# Patient Record
Sex: FEMALE | Race: ASIAN | NOT HISPANIC OR LATINO
[De-identification: names, ages, dates, MRNs, and addresses within clinical notes are randomized per-mention and may not be internally consistent; named-entity substitution may affect disease eponyms.]

---

## 2022-06-03 PROBLEM — Z00.00 ENCOUNTER FOR PREVENTIVE HEALTH EXAMINATION: Status: ACTIVE | Noted: 2022-06-03

## 2022-06-10 ENCOUNTER — APPOINTMENT (OUTPATIENT)
Dept: GYNECOLOGIC ONCOLOGY | Facility: CLINIC | Age: 67
End: 2022-06-10
Payer: MEDICARE

## 2022-06-10 VITALS
HEIGHT: 68.11 IN | HEART RATE: 105 BPM | SYSTOLIC BLOOD PRESSURE: 136 MMHG | DIASTOLIC BLOOD PRESSURE: 79 MMHG | BODY MASS INDEX: 17.28 KG/M2 | WEIGHT: 114 LBS

## 2022-06-10 DIAGNOSIS — Z80.0 FAMILY HISTORY OF MALIGNANT NEOPLASM OF DIGESTIVE ORGANS: ICD-10-CM

## 2022-06-10 DIAGNOSIS — Z41.9 ENCOUNTER FOR PROCEDURE FOR PURPOSES OTHER THAN REMEDYING HEALTH STATE, UNSPECIFIED: ICD-10-CM

## 2022-06-10 PROCEDURE — 99205 OFFICE O/P NEW HI 60 MIN: CPT

## 2022-06-13 ENCOUNTER — OUTPATIENT (OUTPATIENT)
Dept: OUTPATIENT SERVICES | Facility: HOSPITAL | Age: 67
LOS: 1 days | End: 2022-06-13
Payer: MEDICARE

## 2022-06-13 VITALS
HEIGHT: 65 IN | DIASTOLIC BLOOD PRESSURE: 75 MMHG | RESPIRATION RATE: 16 BRPM | OXYGEN SATURATION: 97 % | SYSTOLIC BLOOD PRESSURE: 124 MMHG | TEMPERATURE: 98 F | WEIGHT: 143.96 LBS | HEART RATE: 78 BPM

## 2022-06-13 DIAGNOSIS — N83.299 OTHER OVARIAN CYST, UNSPECIFIED SIDE: ICD-10-CM

## 2022-06-13 DIAGNOSIS — N83.209 UNSPECIFIED OVARIAN CYST, UNSPECIFIED SIDE: ICD-10-CM

## 2022-06-13 DIAGNOSIS — R54 AGE-RELATED PHYSICAL DEBILITY: ICD-10-CM

## 2022-06-13 DIAGNOSIS — R52 PAIN, UNSPECIFIED: Chronic | ICD-10-CM

## 2022-06-13 LAB
ANION GAP SERPL CALC-SCNC: 10 MMOL/L — SIGNIFICANT CHANGE UP (ref 7–14)
BLD GP AB SCN SERPL QL: NEGATIVE — SIGNIFICANT CHANGE UP
BUN SERPL-MCNC: 17 MG/DL — SIGNIFICANT CHANGE UP (ref 7–23)
CALCIUM SERPL-MCNC: 9.3 MG/DL — SIGNIFICANT CHANGE UP (ref 8.4–10.5)
CHLORIDE SERPL-SCNC: 103 MMOL/L — SIGNIFICANT CHANGE UP (ref 98–107)
CO2 SERPL-SCNC: 29 MMOL/L — SIGNIFICANT CHANGE UP (ref 22–31)
CREAT SERPL-MCNC: 0.66 MG/DL — SIGNIFICANT CHANGE UP (ref 0.5–1.3)
EGFR: 96 ML/MIN/1.73M2 — SIGNIFICANT CHANGE UP
GLUCOSE SERPL-MCNC: 97 MG/DL — SIGNIFICANT CHANGE UP (ref 70–99)
HCT VFR BLD CALC: 38.1 % — SIGNIFICANT CHANGE UP (ref 34.5–45)
HGB BLD-MCNC: 12.5 G/DL — SIGNIFICANT CHANGE UP (ref 11.5–15.5)
MCHC RBC-ENTMCNC: 30.6 PG — SIGNIFICANT CHANGE UP (ref 27–34)
MCHC RBC-ENTMCNC: 32.8 GM/DL — SIGNIFICANT CHANGE UP (ref 32–36)
MCV RBC AUTO: 93.2 FL — SIGNIFICANT CHANGE UP (ref 80–100)
NRBC # BLD: 0 /100 WBCS — SIGNIFICANT CHANGE UP
NRBC # FLD: 0 K/UL — SIGNIFICANT CHANGE UP
PLATELET # BLD AUTO: 209 K/UL — SIGNIFICANT CHANGE UP (ref 150–400)
POTASSIUM SERPL-MCNC: 4.4 MMOL/L — SIGNIFICANT CHANGE UP (ref 3.5–5.3)
POTASSIUM SERPL-SCNC: 4.4 MMOL/L — SIGNIFICANT CHANGE UP (ref 3.5–5.3)
RBC # BLD: 4.09 M/UL — SIGNIFICANT CHANGE UP (ref 3.8–5.2)
RBC # FLD: 13.7 % — SIGNIFICANT CHANGE UP (ref 10.3–14.5)
RH IG SCN BLD-IMP: POSITIVE — SIGNIFICANT CHANGE UP
SODIUM SERPL-SCNC: 142 MMOL/L — SIGNIFICANT CHANGE UP (ref 135–145)
WBC # BLD: 6.19 K/UL — SIGNIFICANT CHANGE UP (ref 3.8–10.5)
WBC # FLD AUTO: 6.19 K/UL — SIGNIFICANT CHANGE UP (ref 3.8–10.5)

## 2022-06-13 PROCEDURE — 93010 ELECTROCARDIOGRAM REPORT: CPT

## 2022-06-13 RX ORDER — MULTIVIT-MIN/FERROUS GLUCONATE 9 MG/15 ML
1 LIQUID (ML) ORAL
Qty: 0 | Refills: 0 | DISCHARGE

## 2022-06-13 NOTE — H&P PST ADULT - PROBLEM SELECTOR PLAN 2
Await prearranged medical evaluation with pcp due to major surgery and advanced age  * Instructed to take normal am dose of omeprazole the am of surgery

## 2022-06-13 NOTE — H&P PST ADULT - NSICDXPASTSURGICALHX_GEN_ALL_CORE_FT
PAST SURGICAL HISTORY:   delivery delivered 1989    Pain left arm surgery     PAST SURGICAL HISTORY:   delivery delivered 1989    Pain left arm surgery with hardware

## 2022-06-13 NOTE — H&P PST ADULT - NSANTHOSAYNRD_GEN_A_CORE
No. JCARLOS screening performed.  STOP BANG Legend: 0-2 = LOW Risk; 3-4 = INTERMEDIATE Risk; 5-8 = HIGH Risk

## 2022-06-13 NOTE — H&P PST ADULT - NSICDXFAMILYHX_GEN_ALL_CORE_FT
FAMILY HISTORY:  Father  Still living? No  FHx: throat cancer, Age at diagnosis: Age Unknown    Sibling  Still living? No  FH: liver cancer, Age at diagnosis: Age Unknown

## 2022-06-13 NOTE — H&P PST ADULT - NS PRO LAST MENSTRUAL DATE
Oxcarbazepine 300 mg refill refused. Not due until Jan 2019    Last refill:  oxcarbazepine (TRILEPTAL) 300 MG tablet 60 tablet 3 9/12/2018     Sig - Route: Take 1 tablet by mouth 2 times daily. - Oral          
postmenopausal

## 2022-06-13 NOTE — H&P PST ADULT - REASON FOR ADMISSION
they are taking out the uterus, and tubes and ovaries as per the son, [place], they are taking out the uterus, and tubes and ovaries

## 2022-06-13 NOTE — H&P PST ADULT - PROBLEM SELECTOR PLAN 1
This is a 67 y/o female who is scheduled for total lap hysterectomy, b/l salpingo oophorectomy, possible staging on 6-21-22 This is a 67 y/o female who is scheduled for total lap hysterectomy, b/l salpingo oophorectomy, possible staging on 6-21-22  * Ordered covid-19 PCR and given phone numbers of the facilities that do testing  * Given preop and cleanser instructions with good teach back and patient verbalized understanding

## 2022-06-13 NOTE — H&P PST ADULT - NSICDXPASTMEDICALHX_GEN_ALL_CORE_FT
PAST MEDICAL HISTORY:  Hypercholesterolemia     Language barrier native language is Mandarin, Chinese; comprehends and verbalizes minimal English    Osteoporosis      PAST MEDICAL HISTORY:  Anxiety and depression     GERD (gastroesophageal reflux disease)     Hypercholesterolemia     Language barrier native language is Mandarin, Chinese; comprehends and verbalizes minimal English    Osteoporosis     Ovarian cyst left side in 2022

## 2022-06-13 NOTE — H&P PST ADULT - HISTORY OF PRESENT ILLNESS
This is a 65 y/o female whose native language is Mandarin Chinese. Refused hospital . Utilized son, Iesha, to function as . Patient presents with finding of ovarian cyst. Followed with serial sonograms with recent sonogram revealing increased growth. Further intervention warranted. Scheduled  for total laparoscopic hysterectomy, b/l salpingo oophorectomy, possible staging This is a 67 y/o female whose native language is Mandarin Chinese. Refused hospital . Utilized son, Iesha, to function as . Patient presents with finding of ovarian cyst. Followed with serial sonograms with recent sonogram revealing increased growth. Work up included MRI. Further intervention warranted. Scheduled  for total laparoscopic hysterectomy, b/l salpingo oophorectomy, possible staging

## 2022-06-14 LAB — HPV HIGH+LOW RISK DNA PNL CVX: NOT DETECTED

## 2022-06-20 LAB — CYTOLOGY CVX/VAG DOC THIN PREP: NORMAL

## 2022-06-21 LAB — SARS-COV-2 N GENE NPH QL NAA+PROBE: NOT DETECTED

## 2022-06-22 ENCOUNTER — TRANSCRIPTION ENCOUNTER (OUTPATIENT)
Age: 67
End: 2022-06-22

## 2022-06-22 NOTE — ASU PATIENT PROFILE, ADULT - FALL HARM RISK - UNIVERSAL INTERVENTIONS
Bed in lowest position, wheels locked, appropriate side rails in place/Call bell, personal items and telephone in reach/Instruct patient to call for assistance before getting out of bed or chair/Non-slip footwear when patient is out of bed/[place] to call system/Physically safe environment - no spills, clutter or unnecessary equipment/Purposeful Proactive Rounding/Room/bathroom lighting operational, light cord in reach

## 2022-06-22 NOTE — ASU PATIENT PROFILE, ADULT - NSICDXPASTMEDICALHX_GEN_ALL_CORE_FT
PAST MEDICAL HISTORY:  Anxiety and depression     GERD (gastroesophageal reflux disease)     Hypercholesterolemia     Language barrier native language is Mandarin, Chinese; comprehends and verbalizes minimal English    Osteoporosis     Ovarian cyst left side in 2022

## 2022-06-23 ENCOUNTER — INPATIENT (INPATIENT)
Facility: HOSPITAL | Age: 67
LOS: 0 days | Discharge: HOME | End: 2022-06-24
Attending: OBSTETRICS & GYNECOLOGY | Admitting: OBSTETRICS & GYNECOLOGY
Payer: MEDICARE

## 2022-06-23 ENCOUNTER — RESULT REVIEW (OUTPATIENT)
Age: 67
End: 2022-06-23

## 2022-06-23 ENCOUNTER — APPOINTMENT (OUTPATIENT)
Dept: GYNECOLOGIC ONCOLOGY | Facility: HOSPITAL | Age: 67
End: 2022-06-23

## 2022-06-23 ENCOUNTER — TRANSCRIPTION ENCOUNTER (OUTPATIENT)
Age: 67
End: 2022-06-23

## 2022-06-23 VITALS
WEIGHT: 143.96 LBS | SYSTOLIC BLOOD PRESSURE: 127 MMHG | HEART RATE: 78 BPM | RESPIRATION RATE: 14 BRPM | TEMPERATURE: 98 F | HEIGHT: 65 IN | DIASTOLIC BLOOD PRESSURE: 72 MMHG | OXYGEN SATURATION: 98 %

## 2022-06-23 DIAGNOSIS — N83.299 OTHER OVARIAN CYST, UNSPECIFIED SIDE: ICD-10-CM

## 2022-06-23 DIAGNOSIS — R52 PAIN, UNSPECIFIED: Chronic | ICD-10-CM

## 2022-06-23 PROCEDURE — 38573 LAPS PELVIC LYMPHADEC: CPT

## 2022-06-23 PROCEDURE — 58571 TLH W/T/O 250 G OR LESS: CPT

## 2022-06-23 PROCEDURE — 88341 IMHCHEM/IMCYTCHM EA ADD ANTB: CPT | Mod: 26,59

## 2022-06-23 PROCEDURE — 88331 PATH CONSLTJ SURG 1 BLK 1SPC: CPT | Mod: 26

## 2022-06-23 PROCEDURE — 88305 TISSUE EXAM BY PATHOLOGIST: CPT | Mod: 26

## 2022-06-23 PROCEDURE — 88307 TISSUE EXAM BY PATHOLOGIST: CPT | Mod: 26

## 2022-06-23 PROCEDURE — S2900 ROBOTIC SURGICAL SYSTEM: CPT | Mod: NC

## 2022-06-23 PROCEDURE — 88342 IMHCHEM/IMCYTCHM 1ST ANTB: CPT | Mod: 26,59

## 2022-06-23 PROCEDURE — 88360 TUMOR IMMUNOHISTOCHEM/MANUAL: CPT | Mod: 26

## 2022-06-23 PROCEDURE — 88309 TISSUE EXAM BY PATHOLOGIST: CPT | Mod: 26

## 2022-06-23 PROCEDURE — 88112 CYTOPATH CELL ENHANCE TECH: CPT | Mod: 26

## 2022-06-23 DEVICE — SURGICEL POWDER 3 GRAMS
Type: IMPLANTABLE DEVICE | Status: NON-FUNCTIONAL
Removed: 2022-06-23

## 2022-06-23 RX ORDER — HEPARIN SODIUM 5000 [USP'U]/ML
5000 INJECTION INTRAVENOUS; SUBCUTANEOUS EVERY 8 HOURS
Refills: 0 | Status: DISCONTINUED | OUTPATIENT
Start: 2022-06-23 | End: 2022-06-24

## 2022-06-23 RX ORDER — SODIUM CHLORIDE 9 MG/ML
1000 INJECTION, SOLUTION INTRAVENOUS
Refills: 0 | Status: DISCONTINUED | OUTPATIENT
Start: 2022-06-23 | End: 2022-06-24

## 2022-06-23 RX ORDER — MULTIVIT-MIN/FERROUS GLUCONATE 9 MG/15 ML
1 LIQUID (ML) ORAL
Qty: 0 | Refills: 0 | DISCHARGE

## 2022-06-23 RX ORDER — PANTOPRAZOLE SODIUM 20 MG/1
40 TABLET, DELAYED RELEASE ORAL
Refills: 0 | Status: DISCONTINUED | OUTPATIENT
Start: 2022-06-23 | End: 2022-06-24

## 2022-06-23 RX ORDER — FOLIC ACID 0.8 MG
1 TABLET ORAL
Qty: 0 | Refills: 0 | DISCHARGE

## 2022-06-23 RX ORDER — ERGOCALCIFEROL 1.25 MG/1
1 CAPSULE ORAL
Qty: 0 | Refills: 0 | DISCHARGE

## 2022-06-23 RX ORDER — OXYCODONE HYDROCHLORIDE 5 MG/1
1 TABLET ORAL
Qty: 6 | Refills: 0
Start: 2022-06-23 | End: 2022-06-24

## 2022-06-23 RX ORDER — RIBOFLAVIN (VITAMIN B2) 25 MG
1 TABLET ORAL
Qty: 0 | Refills: 0 | DISCHARGE

## 2022-06-23 RX ORDER — OXYCODONE HYDROCHLORIDE 5 MG/1
5 TABLET ORAL ONCE
Refills: 0 | Status: DISCONTINUED | OUTPATIENT
Start: 2022-06-23 | End: 2022-06-24

## 2022-06-23 RX ORDER — OXYCODONE HYDROCHLORIDE 5 MG/1
5 TABLET ORAL EVERY 4 HOURS
Refills: 0 | Status: DISCONTINUED | OUTPATIENT
Start: 2022-06-23 | End: 2022-06-24

## 2022-06-23 RX ORDER — ONDANSETRON 8 MG/1
4 TABLET, FILM COATED ORAL ONCE
Refills: 0 | Status: DISCONTINUED | OUTPATIENT
Start: 2022-06-23 | End: 2022-06-24

## 2022-06-23 RX ORDER — FENTANYL CITRATE 50 UG/ML
25 INJECTION INTRAVENOUS
Refills: 0 | Status: DISCONTINUED | OUTPATIENT
Start: 2022-06-23 | End: 2022-06-24

## 2022-06-23 RX ORDER — MECLIZINE HCL 12.5 MG
1 TABLET ORAL
Qty: 0 | Refills: 0 | DISCHARGE

## 2022-06-23 RX ORDER — OMEPRAZOLE 10 MG/1
1 CAPSULE, DELAYED RELEASE ORAL
Qty: 0 | Refills: 0 | DISCHARGE

## 2022-06-23 RX ORDER — ATORVASTATIN CALCIUM 80 MG/1
1 TABLET, FILM COATED ORAL
Qty: 0 | Refills: 0 | DISCHARGE

## 2022-06-23 RX ORDER — IBUPROFEN 200 MG
600 TABLET ORAL EVERY 6 HOURS
Refills: 0 | Status: DISCONTINUED | OUTPATIENT
Start: 2022-06-23 | End: 2022-06-24

## 2022-06-23 RX ORDER — ACETAMINOPHEN 500 MG
975 TABLET ORAL EVERY 6 HOURS
Refills: 0 | Status: DISCONTINUED | OUTPATIENT
Start: 2022-06-23 | End: 2022-06-24

## 2022-06-23 RX ORDER — ICOSAPENT ETHYL 500 MG/1
2 CAPSULE, LIQUID FILLED ORAL
Qty: 0 | Refills: 0 | DISCHARGE

## 2022-06-23 RX ORDER — ALENDRONATE SODIUM 70 MG/1
1 TABLET ORAL
Qty: 0 | Refills: 0 | DISCHARGE

## 2022-06-23 RX ADMIN — HEPARIN SODIUM 5000 UNIT(S): 5000 INJECTION INTRAVENOUS; SUBCUTANEOUS at 21:00

## 2022-06-23 RX ADMIN — FENTANYL CITRATE 25 MICROGRAM(S): 50 INJECTION INTRAVENOUS at 19:28

## 2022-06-23 RX ADMIN — SODIUM CHLORIDE 125 MILLILITER(S): 9 INJECTION, SOLUTION INTRAVENOUS at 19:29

## 2022-06-23 RX ADMIN — FENTANYL CITRATE 25 MICROGRAM(S): 50 INJECTION INTRAVENOUS at 19:43

## 2022-06-23 NOTE — ASU DISCHARGE PLAN (ADULT/PEDIATRIC) - ASU DC SPECIAL INSTRUCTIONSFT
Regular diet as tolerated, regular activity as tolerated, no heavy lifting for first two weeks.  Nothing per vagina: no intercourse, tampons or douching.  Call your provider if you experience fevers, chills, worsening abdominal pain, inability to urinate or worsening vaginal bleeding.  Follow up with Dr. Jarvis in 2 weeks

## 2022-06-23 NOTE — ASU DISCHARGE PLAN (ADULT/PEDIATRIC) - NS MD DC FALL RISK RISK
For information on Fall & Injury Prevention, visit: [email or web address] OR  [email or web address] OR  https://www.cdc.gov/steadi/patient.html

## 2022-06-23 NOTE — CHART NOTE - NSCHARTNOTEFT_GEN_A_CORE
**Incomplete**    R1 OBGYN POST-OP CHECK    S: Patient seen and evaluated at bedside.  Pt awake and alert resting comfortably in bed. Patient reports pain controlled with analgesia. Pt denies N/V, SOB, CP, palpitations, fever/chills. Tolerating clears.  Not OOB yet.    O:   T(C): 36.6 (06-23-22 @ 21:00), Max: 36.8 (06-23-22 @ 20:15)  HR: 79 (06-23-22 @ 21:15) (77 - 89)  BP: 124/63 (06-23-22 @ 21:15) (121/64 - 140/121)  RR: 12 (06-23-22 @ 21:15) (11 - 15)  SpO2: 100% (06-23-22 @ 21:15) (96% - 100%)  Wt(kg): --  I&O's Summary    23 Jun 2022 07:01  -  23 Jun 2022 21:53  --------------------------------------------------------  IN: 250 mL / OUT: 360 mL / NET: -110 mL        Gen: Resting comfortably in bed, NAD  CV: S1S2, RRR  Lungs: CTA B/L  Abd: soft, appropriately tender, occasional BS x 4 quadrants.    Inc: Clean/dry/intact w/ bandage in place  Ext: SCD's in place and functional, non-tender b/l, no edema        A/P: 66y Female s/p ......    Neuro: PO Analgesia PRN  vs. PCA for pain control  CV: Hemodynamically stable.  Monitor VS. CBC in AM.   Pulm: Saturating well on room air.  Encourage OOB and incentive spirometer use.   GI: Advance to regular diet. Anti-emetics PRN.  : Meraz to gravity. TOV in AM/DTV by   FEN: Electrolytes: LR@125cc/hr. BMP in AM.   Heme: DVT ppx w/ SCD's while in bed. Early ambulation, initially with assistance then as tolerated.  Continue HSQ   ID: Afebrile  Endo: No active issues   Dispo: Discharge from PACU when criteria met.     BERTRAND Quezada, PGY2 R1 OBGYN POST-OP CHECK  Miryam  [place] ID#[id number]    S: Patient seen and evaluated at bedside.  Pt sleeping in bed, able to be awakened. Patient reports pain controlled with analgesia. Pt denies N/V, SOB, CP, palpitations, fever/chills. Tolerating clears.  Not OOB yet.    O:   T(C): 36.6 (06-23-22 @ 21:00), Max: 36.8 (06-23-22 @ 20:15)  HR: 79 (06-23-22 @ 21:15) (77 - 89)  BP: 124/63 (06-23-22 @ 21:15) (121/64 - 140/121)  RR: 12 (06-23-22 @ 21:15) (11 - 15)  SpO2: 100% (06-23-22 @ 21:15) (96% - 100%)  Wt(kg): --  I&O's Summary    23 Jun 2022 07:01  -  23 Jun 2022 21:53  --------------------------------------------------------  IN: 250 mL / OUT: 360 mL / NET: -110 mL        Gen: Resting comfortably in bed, NAD  CV: S1S2, RRR  Lungs: CTA B/L  Abd: soft, appropriately tender, occasional BS x 4 quadrants.    Inc: 4 LSC incisions clean/dry/intact w/ bandage in place  Ext: SCD's in place and functional, non-tender b/l, no edema        A/P: 66y Female s/p RA-TLH, BSO    Neuro: PO Analgesia PRN    CV: Hemodynamically stable.  Monitor VS. CBC in AM.   Pulm: Saturating well on room air.  Encourage OOB and incentive spirometer use.   GI: Advance to regular diet. Anti-emetics PRN.  : Meraz to gravity until AM  FEN: Electrolytes: LR@125cc/hr. BMP in AM.   Heme: DVT ppx w/ SCD's while in bed. Early ambulation, initially with assistance then as tolerated.  Continue HSQ   ID: Afebrile  Endo: No active issues   Dispo: Floor once PACU discharge criteria met    M. Pilar, PGY1

## 2022-06-23 NOTE — BRIEF OPERATIVE NOTE - OPERATION/FINDINGS
Approx. 8 cm left sided adnexal mass w/ solid and cystic components filled w/ mucinous fluid.  Intraoperative pathology= frozen.  Right adnexa and uterus wnl.  No evidence of obvious metastatic disease.  Enlarged obturator node on the right side.

## 2022-06-23 NOTE — BRIEF OPERATIVE NOTE - NSICDXBRIEFPROCEDURE_GEN_ALL_CORE_FT
PROCEDURES:  Exam under anesthesia, pelvic 23-Jun-2022 19:01:10  Liliana Galvan  Hysterectomy, total, robot-assisted, laparoscopic, with bilateral salpingo-oophorectomy and lymph node sampling 23-Jun-2022 19:01:38  Liliana Galvan  Partial omentectomy 23-Jun-2022 19:01:50  Liliana Galvan  Paraaortic node dissection 23-Jun-2022 19:01:59  Liliana Galvan

## 2022-06-23 NOTE — ASU PREOP CHECKLIST - SELECT TESTS ORDERED
BMP/CBC/Type and Screen/EKG/COVID-19 BMP/CBC/Type and Screen/EKG/POCT Blood Glucose/COVID-19 93/BMP/CBC/Type and Screen/EKG/POCT Blood Glucose/COVID-19

## 2022-06-23 NOTE — ASU DISCHARGE PLAN (ADULT/PEDIATRIC) - PROCEDURE
Robotic assisted total laparoscopic hysterectomy with bilateral salpingo-oophorectomy pelvic lymph node dissection

## 2022-06-23 NOTE — BRIEF OPERATIVE NOTE - SPECIMENS
pelvic washings, cervix, uterus, bilateral fallopian tubes and ovaries, pelvic lymph nodes, paraaortic lymph nodes, omentum

## 2022-06-23 NOTE — ASU PREOP CHECKLIST - NOTHING BY MOUTH SINCE
CLINICAL PHARMACY NOTE: MEDS TO [address] Select Patient?: No  Total # of Prescriptions Filled: 0   The following medications were delivered to the patient:  · Cipro  · Prednisone  · Furosemide  Total # of Interventions Completed: 0  Time Spent (min): 15    Additional Documentation:    Patient discharged 5/12/20, prescriptions not picked up. 22-Jun-2022 20:00

## 2022-06-23 NOTE — ASU DISCHARGE PLAN (ADULT/PEDIATRIC) - CARE PROVIDER_API CALL
Linda Jarvis)  Gynecologic Oncology; Obstetrics and Gynecology  [address] [place] [address]  Phone: [phone]  Fax: [phone]  Follow Up Time:

## 2022-06-24 ENCOUNTER — TRANSCRIPTION ENCOUNTER (OUTPATIENT)
Age: 67
End: 2022-06-24

## 2022-06-24 VITALS
SYSTOLIC BLOOD PRESSURE: 98 MMHG | DIASTOLIC BLOOD PRESSURE: 52 MMHG | HEART RATE: 76 BPM | TEMPERATURE: 98 F | OXYGEN SATURATION: 97 % | RESPIRATION RATE: 17 BRPM

## 2022-06-24 DIAGNOSIS — N83.8 OTHER NONINFLAMMATORY DISORDERS OF OVARY, FALLOPIAN TUBE AND BROAD LIGAMENT: ICD-10-CM

## 2022-06-24 LAB
ANION GAP SERPL CALC-SCNC: 10 MMOL/L — SIGNIFICANT CHANGE UP (ref 7–14)
BASOPHILS # BLD AUTO: 0.01 K/UL — SIGNIFICANT CHANGE UP (ref 0–0.2)
BASOPHILS NFR BLD AUTO: 0.1 % — SIGNIFICANT CHANGE UP (ref 0–2)
BUN SERPL-MCNC: 12 MG/DL — SIGNIFICANT CHANGE UP (ref 7–23)
CALCIUM SERPL-MCNC: 8.2 MG/DL — LOW (ref 8.4–10.5)
CHLORIDE SERPL-SCNC: 105 MMOL/L — SIGNIFICANT CHANGE UP (ref 98–107)
CO2 SERPL-SCNC: 25 MMOL/L — SIGNIFICANT CHANGE UP (ref 22–31)
CREAT SERPL-MCNC: 0.62 MG/DL — SIGNIFICANT CHANGE UP (ref 0.5–1.3)
EGFR: 98 ML/MIN/1.73M2 — SIGNIFICANT CHANGE UP
EOSINOPHIL # BLD AUTO: 0 K/UL — SIGNIFICANT CHANGE UP (ref 0–0.5)
EOSINOPHIL NFR BLD AUTO: 0 % — SIGNIFICANT CHANGE UP (ref 0–6)
GLUCOSE BLDC GLUCOMTR-MCNC: 93 MG/DL — SIGNIFICANT CHANGE UP (ref 70–99)
GLUCOSE SERPL-MCNC: 105 MG/DL — HIGH (ref 70–99)
HCT VFR BLD CALC: 34.7 % — SIGNIFICANT CHANGE UP (ref 34.5–45)
HGB BLD-MCNC: 11.5 G/DL — SIGNIFICANT CHANGE UP (ref 11.5–15.5)
IANC: 6 K/UL — SIGNIFICANT CHANGE UP (ref 1.8–7.4)
IMM GRANULOCYTES NFR BLD AUTO: 0.6 % — SIGNIFICANT CHANGE UP (ref 0–1.5)
LYMPHOCYTES # BLD AUTO: 1.37 K/UL — SIGNIFICANT CHANGE UP (ref 1–3.3)
LYMPHOCYTES # BLD AUTO: 17.2 % — SIGNIFICANT CHANGE UP (ref 13–44)
MAGNESIUM SERPL-MCNC: 1.8 MG/DL — SIGNIFICANT CHANGE UP (ref 1.6–2.6)
MCHC RBC-ENTMCNC: 30.7 PG — SIGNIFICANT CHANGE UP (ref 27–34)
MCHC RBC-ENTMCNC: 33.1 GM/DL — SIGNIFICANT CHANGE UP (ref 32–36)
MCV RBC AUTO: 92.5 FL — SIGNIFICANT CHANGE UP (ref 80–100)
MONOCYTES # BLD AUTO: 0.55 K/UL — SIGNIFICANT CHANGE UP (ref 0–0.9)
MONOCYTES NFR BLD AUTO: 6.9 % — SIGNIFICANT CHANGE UP (ref 2–14)
NEUTROPHILS # BLD AUTO: 6 K/UL — SIGNIFICANT CHANGE UP (ref 1.8–7.4)
NEUTROPHILS NFR BLD AUTO: 75.2 % — SIGNIFICANT CHANGE UP (ref 43–77)
NON-GYNECOLOGICAL CYTOLOGY STUDY: SIGNIFICANT CHANGE UP
NRBC # BLD: 0 /100 WBCS — SIGNIFICANT CHANGE UP
NRBC # FLD: 0 K/UL — SIGNIFICANT CHANGE UP
PHOSPHATE SERPL-MCNC: 3.2 MG/DL — SIGNIFICANT CHANGE UP (ref 2.5–4.5)
PLATELET # BLD AUTO: 169 K/UL — SIGNIFICANT CHANGE UP (ref 150–400)
POTASSIUM SERPL-MCNC: 3.4 MMOL/L — LOW (ref 3.5–5.3)
POTASSIUM SERPL-SCNC: 3.4 MMOL/L — LOW (ref 3.5–5.3)
RBC # BLD: 3.75 M/UL — LOW (ref 3.8–5.2)
RBC # FLD: 13.9 % — SIGNIFICANT CHANGE UP (ref 10.3–14.5)
SODIUM SERPL-SCNC: 140 MMOL/L — SIGNIFICANT CHANGE UP (ref 135–145)
WBC # BLD: 7.98 K/UL — SIGNIFICANT CHANGE UP (ref 3.8–10.5)
WBC # FLD AUTO: 7.98 K/UL — SIGNIFICANT CHANGE UP (ref 3.8–10.5)

## 2022-06-24 RX ORDER — POTASSIUM CHLORIDE 20 MEQ
40 PACKET (EA) ORAL EVERY 4 HOURS
Refills: 0 | Status: DISCONTINUED | OUTPATIENT
Start: 2022-06-24 | End: 2022-06-24

## 2022-06-24 RX ORDER — MAGNESIUM OXIDE 400 MG ORAL TABLET 241.3 MG
400 TABLET ORAL ONCE
Refills: 0 | Status: COMPLETED | OUTPATIENT
Start: 2022-06-24 | End: 2022-06-24

## 2022-06-24 RX ADMIN — Medication 975 MILLIGRAM(S): at 00:15

## 2022-06-24 RX ADMIN — PANTOPRAZOLE SODIUM 40 MILLIGRAM(S): 20 TABLET, DELAYED RELEASE ORAL at 07:23

## 2022-06-24 RX ADMIN — Medication 975 MILLIGRAM(S): at 05:57

## 2022-06-24 RX ADMIN — SODIUM CHLORIDE 125 MILLILITER(S): 9 INJECTION, SOLUTION INTRAVENOUS at 00:29

## 2022-06-24 RX ADMIN — MAGNESIUM OXIDE 400 MG ORAL TABLET 400 MILLIGRAM(S): 241.3 TABLET ORAL at 12:13

## 2022-06-24 RX ADMIN — HEPARIN SODIUM 5000 UNIT(S): 5000 INJECTION INTRAVENOUS; SUBCUTANEOUS at 05:57

## 2022-06-24 RX ADMIN — Medication 40 MILLIEQUIVALENT(S): at 11:29

## 2022-06-24 RX ADMIN — Medication 600 MILLIGRAM(S): at 05:57

## 2022-06-24 RX ADMIN — Medication 600 MILLIGRAM(S): at 00:15

## 2022-06-24 RX ADMIN — Medication 600 MILLIGRAM(S): at 11:29

## 2022-06-24 RX ADMIN — Medication 975 MILLIGRAM(S): at 11:29

## 2022-06-24 NOTE — DISCHARGE NOTE PROVIDER - NSDCMRMEDTOKEN_GEN_ALL_CORE_FT
alendronate 70 mg oral tablet: 1 tab(s) orally once a week  atorvastatin 10 mg oral tablet: 1 tab(s) orally once a day  Calcium 600+D oral tablet: 1 tab(s) orally 2 times a day  ergocalciferol 1.25 mg (50,000 intl units) oral capsule: 1 cap(s) orally once a week  folic acid 1 mg oral tablet: 1 tab(s) orally once a day  meclizine 12.5 mg oral tablet: 1 tab(s) orally 2 times a day, As Needed  naproxen 500 mg oral tablet: 1 tab(s) orally 2 times a day MDD:2 tabs  omeprazole 20 mg oral delayed release tablet: 1 tab(s) orally once a day  oxyCODONE 5 mg oral tablet: 1 tab(s) orally every 8 hours, As Needed -for severe pain MDD:3 tabs   Prosight oral tablet: 1 tab(s) orally once a day  6/13/2022  refresh lubricant eye ointment: 1 application to each affected eye once a day (at bedtime)  Refresh Optive ophthalmic solution: 1 drop(s) to each affected eye 4 times a day, As Needed  Vascepa 1 g oral capsule: 2 cap(s) orally 2 times a day  6/13/2022  Vitamin B2 100 mg oral tablet: 1 tab(s) orally once a day  6/13/2022

## 2022-06-24 NOTE — PROGRESS NOTE ADULT - ATTENDING COMMENTS
Patient seen and examined at bedside, agree with above gyn resident note, including assessment and plan. Abdomen benign. Discussed today's plan of care with patient, all questions answered. Continue routine postop care, d/c home today.

## 2022-06-24 NOTE — PROGRESS NOTE ADULT - SUBJECTIVE AND OBJECTIVE BOX
Gyn ONC Progress Note POD #1    Subjective:   Patient seen and examined at bedside.  No acute events overnight. No acute complaints.  Pain well controlled.  Patient is ambulating and tolerating regular diet.  Has not yet passed flatus vs. Patient is passing flatus.    Patient is voiding spontaneously vs. Meraz is still in place.   Denies lightheadedness, dizziness, CP, SOB, N/V, fevers, and chills.    Objective:  T(F): 98.9 (06-24-22 @ 02:30), Max: 99 (06-24-22 @ 00:00)  HR: 75 (06-24-22 @ 02:30) (75 - 89)  BP: 113/54 (06-24-22 @ 02:30) (112/57 - 140/121)  RR: 15 (06-24-22 @ 02:30) (11 - 19)  SpO2: 95% (06-24-22 @ 02:30) (95% - 100%)  Wt(kg): --  I&O's Summary    23 Jun 2022 07:01  -  24 Jun 2022 04:59  --------------------------------------------------------  IN: 950 mL / OUT: 960 mL / NET: -10 mL      CAPILLARY BLOOD GLUCOSE          MEDICATIONS  (STANDING):  acetaminophen     Tablet .. 975 milliGRAM(s) Oral every 6 hours  heparin   Injectable 5000 Unit(s) SubCutaneous every 8 hours  ibuprofen  Tablet. 600 milliGRAM(s) Oral every 6 hours  lactated ringers. 1000 milliLiter(s) (30 mL/Hr) IV Continuous <Continuous>  lactated ringers. 1000 milliLiter(s) (125 mL/Hr) IV Continuous <Continuous>  lactated ringers. 1000 milliLiter(s) (125 mL/Hr) IV Continuous <Continuous>  pantoprazole    Tablet 40 milliGRAM(s) Oral before breakfast    MEDICATIONS  (PRN):  oxyCODONE    IR 5 milliGRAM(s) Oral every 4 hours PRN Severe Pain (7 - 10)      Physical Exam:  Constitutional: NAD, A+O x3  CV: RRR  Lungs: clear to auscultation bilaterally  Abdomen: soft, nondistended, no guarding, no rebound, normal bowel sounds  Incision: 4 port site incisions with steri strips in place, clean, dry, intact  Extremities: no lower extremity edema or calf tenderness bilaterally; venodynes in place    LABS:                           Gyn ONC Progress Note POD #1    Subjective:   Patient seen and examined at bedside.  No acute events overnight. No acute complaints.  Pain well controlled. Patient not yet OOB. Tolerating regular diet. Patient is passing flatus.  Meraz is still in place. Denies lightheadedness, dizziness, CP, SOB, N/V, fevers, and chills.    Objective:  T(F): 98.9 (06-24-22 @ 02:30), Max: 99 (06-24-22 @ 00:00)  HR: 75 (06-24-22 @ 02:30) (75 - 89)  BP: 113/54 (06-24-22 @ 02:30) (112/57 - 140/121)  RR: 15 (06-24-22 @ 02:30) (11 - 19)  SpO2: 95% (06-24-22 @ 02:30) (95% - 100%)  Wt(kg): --  I&O's Summary    23 Jun 2022 07:01  -  24 Jun 2022 04:59  --------------------------------------------------------  IN: 950 mL / OUT: 960 mL / NET: -10 mL      CAPILLARY BLOOD GLUCOSE          MEDICATIONS  (STANDING):  acetaminophen     Tablet .. 975 milliGRAM(s) Oral every 6 hours  heparin   Injectable 5000 Unit(s) SubCutaneous every 8 hours  ibuprofen  Tablet. 600 milliGRAM(s) Oral every 6 hours  lactated ringers. 1000 milliLiter(s) (30 mL/Hr) IV Continuous <Continuous>  lactated ringers. 1000 milliLiter(s) (125 mL/Hr) IV Continuous <Continuous>  lactated ringers. 1000 milliLiter(s) (125 mL/Hr) IV Continuous <Continuous>  pantoprazole    Tablet 40 milliGRAM(s) Oral before breakfast    MEDICATIONS  (PRN):  oxyCODONE    IR 5 milliGRAM(s) Oral every 4 hours PRN Severe Pain (7 - 10)      Physical Exam:  Constitutional: NAD, A+O x3  CV: RRR  Lungs: clear to auscultation bilaterally  Abdomen: soft, nondistended, no guarding, no rebound, normal bowel sounds  Incision: 4 port site incisions with steri strips in place, clean, dry, intact  Extremities: no lower extremity edema or calf tenderness bilaterally; venodynes in place    LABS:

## 2022-06-24 NOTE — DISCHARGE NOTE PROVIDER - NSDCCPTREATMENT_GEN_ALL_CORE_FT
PRINCIPAL PROCEDURE  Procedure: Hysterectomy, total, robot-assisted, laparoscopic, with bilateral salpingo-oophorectomy and lymph node sampling  Findings and Treatment:       SECONDARY PROCEDURE  Procedure: Partial omentectomy  Findings and Treatment:     Procedure: Paraaortic node dissection  Findings and Treatment:     Procedure: Exam under anesthesia, pelvic  Findings and Treatment:

## 2022-06-24 NOTE — DISCHARGE NOTE NURSING/CASE MANAGEMENT/SOCIAL WORK - NSDCPEFALRISK_GEN_ALL_CORE
For information on Fall & Injury Prevention, visit: [email or web address] OR  [email or web address] OR  https://www.cdc.gov/steadi/patient.html

## 2022-06-24 NOTE — DISCHARGE NOTE NURSING/CASE MANAGEMENT/SOCIAL WORK - PATIENT PORTAL LINK FT
You can access the FollowMyHealth Patient Portal offered by [facility] by registering at the following website: [email or web address]. By joining [facility]’s FollowMyHealth portal, you will also be able to view your health information using other applications (apps) compatible with our system.

## 2022-06-24 NOTE — DISCHARGE NOTE NURSING/CASE MANAGEMENT/SOCIAL WORK - NSDCPNINST_GEN_ALL_CORE
Notify Dr Jarvis if you experience any increase in pain not relieved with medication, any redness, drainage or swelling around incision any persistent nausea vomiting or any fever >100.5.  Drink plenty of fluid, no heavy lifting or straining.  Use over the counter stool softeners to assist with constipation which can be a side effect of narcotic pain medication.

## 2022-06-24 NOTE — DISCHARGE NOTE PROVIDER - CARE PROVIDER_API CALL
Linda Jarvis)  Gynecologic Oncology; Obstetrics and Gynecology  [address] [place] [address]  Phone: [phone]  Fax: [phone]  Follow Up Time:

## 2022-06-24 NOTE — PROGRESS NOTE ADULT - PROBLEM SELECTOR PLAN 1
GYN ONC Fellow Addendum:    Pt seen and examined at bedside. Agree with above. Pain controlled. Tolerating reg diet, -n/v. -flatus. yet to ambulate. hagan in situ    VS reviewed  Labs reviewed    - Po analgesia  - Reg diet  - d/c hagan  - Encourage ambulation and IS use  - Replete lytes prn  - DVT ppx: lovenox  - OOB  - Dispo: d/c home today    CHRISTOPHER Hope, PGY6

## 2022-06-24 NOTE — DISCHARGE NOTE PROVIDER - HOSPITAL COURSE
Patient underwent an uncomplicated robotic assisted total laparoscopic hysterectomy with bilateral salpingo-oophorectomy, omentectomy, pelvic and para-aortic lymph node dissection for left adnexal mass. EBL: 50cc. Hct:  38.1-> .  POD#0 Pain was well controlled with PO analgesia. Meraz was discontinued and patient voided spontaneously. POD#1 No acute events overnight. Patient tolerated regular diet.  Pain was well controlled. Upon discharge on POD#1, the patient is ambulating, voiding spontaneously, tolerating oral intake, pain was well controlled with oral medication, and vital signs were stable. Patient to have close follow up with Dr Jarvis. Patient Patient underwent an uncomplicated robotic assisted total laparoscopic hysterectomy with bilateral salpingo-oophorectomy, omentectomy, pelvic and para-aortic lymph node dissection for left adnexal mass. EBL: 50cc. Hct:  38.1-> .  POD#0 Pain was well controlled with PO analgesia. Meraz was discontinued and patient voided spontaneously. POD#1 No acute events overnight. Patient tolerated regular diet.  Pain was well controlled. Upon discharge on POD#1, the patient is ambulating, voiding spontaneously, tolerating oral intake, pain was well controlled with oral medication, and vital signs were stable. Patient to have close follow up with Dr Jarvis.              11.5   7.98  )-----------( 169      ( 06-24 @ 06:00 )             34.7

## 2022-06-24 NOTE — PATIENT PROFILE ADULT - FALL HARM RISK - RISK INTERVENTIONS
Assistance OOB with selected safe patient handling equipment/Assistance with ambulation/Communicate Fall Risk and Risk Factors to all staff, patient, and family/Monitor gait and stability/Reinforce activity limits and safety measures with patient and family/Sit up slowly, dangle for a short time, stand at bedside before walking/Use of alarms - bed, chair and/or voice tab/Visual Cue: Yellow wristband/Bed in lowest position, wheels locked, appropriate side rails in place/Call bell, personal items and telephone in reach/Instruct patient to call for assistance before getting out of bed or chair/Non-slip footwear when patient is out of bed/[place] to call system/Physically safe environment - no spills, clutter or unnecessary equipment/Purposeful Proactive Rounding/Room/bathroom lighting operational, light cord in reach

## 2022-06-24 NOTE — PROGRESS NOTE ADULT - ASSESSMENT
A/P: 66y  HD#2/POD#1 s/p RA-TLH, BSO, omentectomy, PPALND. Pt stable and meeting postoperative milestones.    Neuro: pain well controlled on current regimen   CV: hemodynamically stable, f/u AM CBC  Pulm: O2 sat WNL on RA, increase ambulation, encourage incentive spirometry use  GI: tolerating regular diet  : UOP adequate, voiding spontaneously   Heme: HSQ and SCDs while in bed for DVT ppx  ID: afebrile, no signs of infection  Endo: No active issues  Fe: Replete electrolytes as needed.  Dispo: discharge planning    CRISTHIAN Vizcaino, PGY-1    A/P: 66y  HD#2/POD#1 s/p RA-TLH, BSO, omentectomy, PPALND. Pt stable and meeting postoperative milestones.    Neuro: pain well controlled on current regimen   CV: hemodynamically stable, f/u AM CBC  Pulm: O2 sat WNL on RA, increase ambulation, encourage incentive spirometry use  GI: tolerating regular diet  : UOP adequate, DC Meraz following AM labs  Heme: HSQ and SCDs while in bed for DVT ppx  ID: afebrile, no signs of infection  Endo: No active issues  Fe: Replete electrolytes as needed.  Dispo: discharge planning    CRISTHIAN Vizcaino, PGY-1

## 2022-06-27 ENCOUNTER — NON-APPOINTMENT (OUTPATIENT)
Age: 67
End: 2022-06-27

## 2022-06-27 PROBLEM — N83.209 UNSPECIFIED OVARIAN CYST, UNSPECIFIED SIDE: Chronic | Status: ACTIVE | Noted: 2022-06-13

## 2022-06-27 PROBLEM — Z78.9 OTHER SPECIFIED HEALTH STATUS: Chronic | Status: ACTIVE | Noted: 2022-06-13

## 2022-06-27 PROBLEM — M81.0 AGE-RELATED OSTEOPOROSIS WITHOUT CURRENT PATHOLOGICAL FRACTURE: Chronic | Status: ACTIVE | Noted: 2022-06-13

## 2022-06-27 PROBLEM — K21.9 GASTRO-ESOPHAGEAL REFLUX DISEASE WITHOUT ESOPHAGITIS: Chronic | Status: ACTIVE | Noted: 2022-06-13

## 2022-06-27 PROBLEM — E78.00 PURE HYPERCHOLESTEROLEMIA, UNSPECIFIED: Chronic | Status: ACTIVE | Noted: 2022-06-13

## 2022-06-27 PROBLEM — F41.9 ANXIETY DISORDER, UNSPECIFIED: Chronic | Status: ACTIVE | Noted: 2022-06-13

## 2022-07-08 ENCOUNTER — APPOINTMENT (OUTPATIENT)
Dept: GYNECOLOGIC ONCOLOGY | Facility: CLINIC | Age: 67
End: 2022-07-08

## 2022-07-08 VITALS
SYSTOLIC BLOOD PRESSURE: 131 MMHG | WEIGHT: 114 LBS | HEART RATE: 98 BPM | TEMPERATURE: 98 F | HEIGHT: 68.11 IN | BODY MASS INDEX: 17.28 KG/M2 | DIASTOLIC BLOOD PRESSURE: 73 MMHG

## 2022-07-08 PROCEDURE — 99024 POSTOP FOLLOW-UP VISIT: CPT

## 2022-07-13 LAB — SURGICAL PATHOLOGY STUDY: SIGNIFICANT CHANGE UP

## 2022-07-22 ENCOUNTER — NON-APPOINTMENT (OUTPATIENT)
Age: 67
End: 2022-07-22

## 2022-08-10 ENCOUNTER — NON-APPOINTMENT (OUTPATIENT)
Age: 67
End: 2022-08-10

## 2022-08-12 ENCOUNTER — APPOINTMENT (OUTPATIENT)
Dept: GYNECOLOGIC ONCOLOGY | Facility: CLINIC | Age: 67
End: 2022-08-12

## 2022-08-12 VITALS
TEMPERATURE: 97.2 F | DIASTOLIC BLOOD PRESSURE: 64 MMHG | WEIGHT: 114 LBS | BODY MASS INDEX: 17.28 KG/M2 | HEIGHT: 68.11 IN | HEART RATE: 98 BPM | SYSTOLIC BLOOD PRESSURE: 101 MMHG

## 2022-08-12 DIAGNOSIS — N83.299 OTHER OVARIAN CYST, UNSPECIFIED SIDE: ICD-10-CM

## 2022-08-12 PROCEDURE — 99024 POSTOP FOLLOW-UP VISIT: CPT

## 2023-01-04 ENCOUNTER — NON-APPOINTMENT (OUTPATIENT)
Age: 68
End: 2023-01-04

## 2023-01-13 ENCOUNTER — APPOINTMENT (OUTPATIENT)
Dept: GYNECOLOGIC ONCOLOGY | Facility: CLINIC | Age: 68
End: 2023-01-13
Payer: MEDICARE

## 2023-01-13 VITALS
WEIGHT: 114 LBS | DIASTOLIC BLOOD PRESSURE: 65 MMHG | SYSTOLIC BLOOD PRESSURE: 101 MMHG | BODY MASS INDEX: 17.28 KG/M2 | HEART RATE: 82 BPM | HEIGHT: 68.11 IN

## 2023-01-13 PROCEDURE — 99213 OFFICE O/P EST LOW 20 MIN: CPT

## 2023-01-13 NOTE — H&P PST ADULT - BP NONINVASIVE SYSTOLIC (MM HG)
Patient was contacted and given her test results (in normal range). She verbally understood the information given. She asked if a copy of her results could be sent to her via mail. Patient was informed that I ill send her results to her through the mail.   
124

## 2023-01-13 NOTE — HISTORY OF PRESENT ILLNESS
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  Denies abdominal/pelvic pain, dyspnea or chest pain, vaginal bleeding or discharge, nausea/vomiting, changes in bowel habits or urination, lower extremity edema or pain.\par

## 2023-01-13 NOTE — DISCUSSION/SUMMARY
[FreeTextEntry1] : Signs and symptoms of recurrence reviewed in detail, namely vaginal bleeding, abdominal pain, changes in bowel habits or urination, nausea/vomiting.\par F/u in 4 months or sooner prn\par Obtain   and CEA

## 2023-01-17 LAB
CANCER AG125 SERPL-ACNC: 13 U/ML
CEA SERPL-MCNC: 2.7 NG/ML

## 2023-05-31 ENCOUNTER — NON-APPOINTMENT (OUTPATIENT)
Age: 68
End: 2023-05-31

## 2023-06-09 ENCOUNTER — APPOINTMENT (OUTPATIENT)
Dept: GYNECOLOGIC ONCOLOGY | Facility: CLINIC | Age: 68
End: 2023-06-09
Payer: MEDICARE

## 2023-06-09 VITALS
SYSTOLIC BLOOD PRESSURE: 109 MMHG | DIASTOLIC BLOOD PRESSURE: 63 MMHG | WEIGHT: 114 LBS | BODY MASS INDEX: 17.28 KG/M2 | HEART RATE: 88 BPM | HEIGHT: 68.11 IN

## 2023-06-09 PROCEDURE — 99214 OFFICE O/P EST MOD 30 MIN: CPT

## 2023-06-09 NOTE — HISTORY OF PRESENT ILLNESS
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  Denies abdominal/pelvic pain, dyspnea or chest pain, vaginal bleeding or discharge, nausea/vomiting, changes in bowel habits or urination, lower extremity edema or pain.\par

## 2023-06-09 NOTE — DISCUSSION/SUMMARY
[FreeTextEntry1] : Signs and symptoms of recurrence reviewed in detail, namely vaginal bleeding, abdominal pain, changes in bowel habits or urination, nausea/vomiting.\par F/u in 4 months or sooner prn\par Plan for imaging as clinically indicated\par

## 2023-06-15 LAB — CANCER AG125 SERPL-ACNC: 13 U/ML

## 2023-07-14 NOTE — PATIENT PROFILE ADULT - NSTRANSFERBELONGINGSDISPO_GEN_A_NUR
Adbry Pregnancy And Lactation Text: It is unknown if this medication will adversely affect pregnancy or breast feeding. with patient

## 2023-10-13 ENCOUNTER — APPOINTMENT (OUTPATIENT)
Dept: GYNECOLOGIC ONCOLOGY | Facility: CLINIC | Age: 68
End: 2023-10-13
Payer: MEDICARE

## 2023-10-13 PROCEDURE — 99213 OFFICE O/P EST LOW 20 MIN: CPT

## 2023-10-16 LAB — CANCER AG125 SERPL-ACNC: 14 U/ML

## 2024-03-08 ENCOUNTER — NON-APPOINTMENT (OUTPATIENT)
Age: 69
End: 2024-03-08

## 2024-04-12 ENCOUNTER — APPOINTMENT (OUTPATIENT)
Dept: GYNECOLOGIC ONCOLOGY | Facility: CLINIC | Age: 69
End: 2024-04-12
Payer: MEDICARE

## 2024-04-12 VITALS
DIASTOLIC BLOOD PRESSURE: 69 MMHG | WEIGHT: 114 LBS | HEART RATE: 92 BPM | BODY MASS INDEX: 17.28 KG/M2 | SYSTOLIC BLOOD PRESSURE: 108 MMHG

## 2024-04-12 DIAGNOSIS — C56.9 MALIGNANT NEOPLASM OF UNSPECIFIED OVARY: ICD-10-CM

## 2024-04-12 PROCEDURE — 99214 OFFICE O/P EST MOD 30 MIN: CPT

## 2024-04-12 NOTE — HISTORY OF PRESENT ILLNESS
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  She recently had CT chest and is referred to a pulmonologist.  Denies vaginal bleeding or discharge, nausea/vomiting, changes in bowel habits.  Reports increased urination.

## 2024-04-15 LAB — CANCER AG125 SERPL-ACNC: 12 U/ML

## 2024-10-11 ENCOUNTER — APPOINTMENT (OUTPATIENT)
Dept: GYNECOLOGIC ONCOLOGY | Facility: CLINIC | Age: 69
End: 2024-10-11
Payer: MEDICARE

## 2024-10-11 VITALS
DIASTOLIC BLOOD PRESSURE: 78 MMHG | BODY MASS INDEX: 18.19 KG/M2 | WEIGHT: 120 LBS | SYSTOLIC BLOOD PRESSURE: 106 MMHG | HEART RATE: 86 BPM | HEIGHT: 68.11 IN

## 2024-10-11 DIAGNOSIS — C56.9 MALIGNANT NEOPLASM OF UNSPECIFIED OVARY: ICD-10-CM

## 2024-10-11 PROCEDURE — 99459 PELVIC EXAMINATION: CPT

## 2024-10-11 PROCEDURE — 99214 OFFICE O/P EST MOD 30 MIN: CPT

## 2024-10-11 NOTE — HISTORY OF PRESENT ILLNESS
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  No other complaints today.  Denies abdominal/pelvic pain, dyspnea or chest pain, vaginal bleeding or discharge, nausea/vomiting, changes in bowel habits or urination, lower extremity edema or pain.

## 2024-10-15 LAB — CANCER AG125 SERPL-ACNC: 11 U/ML

## 2025-05-29 NOTE — PHYSICAL EXAM
[Chaperoned Physical Exam] : A chaperone was present in the examining room during all aspects of the physical examination. [MA] : MA [Absent] : Adnexa(ae): Absent [Normal] : Recto-Vaginal Exam: Normal

## 2025-06-13 ENCOUNTER — NON-APPOINTMENT (OUTPATIENT)
Age: 70
End: 2025-06-13

## 2025-06-13 ENCOUNTER — APPOINTMENT (OUTPATIENT)
Dept: GYNECOLOGIC ONCOLOGY | Facility: CLINIC | Age: 70
End: 2025-06-13
Payer: MEDICARE

## 2025-06-13 VITALS
BODY MASS INDEX: 22.73 KG/M2 | SYSTOLIC BLOOD PRESSURE: 106 MMHG | HEART RATE: 96 BPM | OXYGEN SATURATION: 95 % | WEIGHT: 150 LBS | HEIGHT: 68 IN | DIASTOLIC BLOOD PRESSURE: 66 MMHG

## 2025-06-13 DIAGNOSIS — C56.9 MALIGNANT NEOPLASM OF UNSPECIFIED OVARY: ICD-10-CM

## 2025-06-13 PROCEDURE — 99213 OFFICE O/P EST LOW 20 MIN: CPT

## 2025-06-13 NOTE — HISTORY OF PRESENT ILLNESS
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

## 2025-06-13 NOTE — DISCUSSION/SUMMARY
[FreeTextEntry1] : Signs and symptoms of recurrence reviewed in detail, namely vaginal bleeding, abdominal pain, changes in bowel habits or urination, nausea/vomiting. F/u in 6 months or sooner prn Plan for imaging as clinically indicated CA-125 today

## 2025-06-17 LAB — CANCER AG125 SERPL-ACNC: 13 U/ML

## (undated) DEVICE — XI DRAPE COLUMN

## (undated) DEVICE — XI OBTURATOR OPTICAL BLADELESS 8MM

## (undated) DEVICE — UTERINE MANIPULATOR CONMED VCARE LG 37MM

## (undated) DEVICE — APPLICATOR SURGICEL LAP TROCAR POINT 2.5MM X 150MM

## (undated) DEVICE — XI ARM FORCEP PROGRASP 8MM

## (undated) DEVICE — XI ARM GRASPER TIP UP FENESTRATED

## (undated) DEVICE — SUT VLOC 180 3-0 9" V-20 GREEN

## (undated) DEVICE — TUBING STRYKEFLOW II SUCTION / IRRIGATOR

## (undated) DEVICE — XI ARM FORCEP TENACULUM

## (undated) DEVICE — SYR LUER LOK 10CC

## (undated) DEVICE — XI ARM FORCEP MARYLAND BIPOLAR

## (undated) DEVICE — POSITIONER PURPLE ARM ONE STEP (LARGE)

## (undated) DEVICE — ENDOCATCH 10MM SPECIMEN POUCH

## (undated) DEVICE — D HELP - CLEARVIEW CLEARIFY SYSTEM

## (undated) DEVICE — XI TIP COVER

## (undated) DEVICE — XI ARM PERMANENT CAUTERY HOOK

## (undated) DEVICE — GOWN XL

## (undated) DEVICE — VENODYNE/SCD SLEEVE CALF MEDIUM

## (undated) DEVICE — PACK ROBOTIC LIJ

## (undated) DEVICE — XI ARM SCISSOR MONO CURVED

## (undated) DEVICE — GLV 5.5 PROTEXIS

## (undated) DEVICE — SUT MONOCRYL 4-0 27" PS-2 UNDYED

## (undated) DEVICE — UTERINE MANIPULATOR MPM MEDICAL ZUMI 4.5MM

## (undated) DEVICE — XI ARM NEEDLE DRIVER SUTURECUT MEGA 8MM

## (undated) DEVICE — GOWN XXXL

## (undated) DEVICE — LUBRICATING JELLY ONESHOT 1.25OZ

## (undated) DEVICE — ELCTR BOVIE TIP BLADE INSULATED 2.75" EDGE

## (undated) DEVICE — SUT VICRYL 0 27" UR-6

## (undated) DEVICE — POSITIONER FOAM HEAD CRADLE (PINK)

## (undated) DEVICE — PACK PERI GYN

## (undated) DEVICE — SUT VICRYL 0 27" CT-2 UNDYED

## (undated) DEVICE — XI DRAPE ARM

## (undated) DEVICE — DRSG OPSITE 13.75 X 4"

## (undated) DEVICE — XI ARM PERMANENT CAUTERY SPATULA

## (undated) DEVICE — TUBING AIRSEAL TRI-LUMEN FILTERED

## (undated) DEVICE — XI ARM FORCEP FENESTRATED BIPOLAR 8MM

## (undated) DEVICE — ELCTR GROUNDING PAD ADULT COVIDIEN

## (undated) DEVICE — POSITIONER PINK PAD PIGAZZI SYSTEM

## (undated) DEVICE — PROTECTOR HEEL / ELBOW

## (undated) DEVICE — POSITIONER STRAP ARMBOARD VELCRO TS-30

## (undated) DEVICE — DRSG STERISTRIPS 0.5 X 4"

## (undated) DEVICE — FOLEY TRAY 16FR 5CC LF UMETER CLOSED

## (undated) DEVICE — TROCAR SURGIQUEST AIRSEAL 8MMX100MM

## (undated) DEVICE — XI NEEDLE DRIVER LARGE

## (undated) DEVICE — XI SEAL UNIV 5- 8 MM

## (undated) DEVICE — GLV 6 PROTEXIS (WHITE)

## (undated) DEVICE — ENDOCATCH II 15MM

## (undated) DEVICE — UTERINE MANIPULATOR CONMED VCARE MED 34MM

## (undated) DEVICE — UTERINE MANIPULATOR CONMED VCARE SM 32MM